# Patient Record
Sex: FEMALE | URBAN - METROPOLITAN AREA
[De-identification: names, ages, dates, MRNs, and addresses within clinical notes are randomized per-mention and may not be internally consistent; named-entity substitution may affect disease eponyms.]

---

## 2021-11-04 ENCOUNTER — OFFICE VISIT (OUTPATIENT)
Dept: URBAN - METROPOLITAN AREA CLINIC 30 | Facility: CLINIC | Age: 41
End: 2021-11-04

## 2021-11-04 DIAGNOSIS — B00.52 HERPESVIRAL KERATOCONJUNCTIVITIS: Primary | ICD-10-CM

## 2021-11-04 PROCEDURE — 99204 OFFICE O/P NEW MOD 45 MIN: CPT | Performed by: STUDENT IN AN ORGANIZED HEALTH CARE EDUCATION/TRAINING PROGRAM

## 2021-11-04 RX ORDER — VALACYCLOVIR 500 MG/1
500 MG TABLET, FILM COATED ORAL
Qty: 21 | Refills: 0 | Status: INACTIVE
Start: 2021-11-04 | End: 2021-11-10

## 2021-11-04 ASSESSMENT — INTRAOCULAR PRESSURE
OS: 16
OD: 16

## 2021-11-04 NOTE — IMPRESSION/PLAN
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
- no ac reaction - 4 linear, likely dendrite lesions on cornea OS
- 360 injection
- fundus clear, vitreous clear
- (+) pan
- vesicles along cheek and around mouth - Dr Nadeem Gallagher also evaluated pt whose observations are most consistent with herpes keratitis and also recommends valtrex after clearance with pcp Plan: start valacyclovir 500 mg tid po after clearance w/ pcp regarding kidney concerns - pt denies any history of kidney failure or concern until ER mentioned kidney failure

## 2021-11-12 ENCOUNTER — OFFICE VISIT (OUTPATIENT)
Dept: URBAN - METROPOLITAN AREA CLINIC 30 | Facility: CLINIC | Age: 41
End: 2021-11-12

## 2021-11-12 DIAGNOSIS — H16.223 KERATOCONJUNCTIVITIS SICCA, BILATERAL: ICD-10-CM

## 2021-11-12 PROCEDURE — 99213 OFFICE O/P EST LOW 20 MIN: CPT | Performed by: STUDENT IN AN ORGANIZED HEALTH CARE EDUCATION/TRAINING PROGRAM

## 2021-11-12 ASSESSMENT — INTRAOCULAR PRESSURE
OD: 13
OS: 13

## 2021-11-12 NOTE — IMPRESSION/PLAN
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
- essentially resolved with tr residual puctate staining
- ac deep and quiet
- significant reduction of injection and irritation Plan: Cont valtrex as prescribed by PCP. Keep f/w with PCP for other potential sites of herpes infection. Cont art tears prn OU